# Patient Record
Sex: FEMALE | Race: WHITE | Employment: UNEMPLOYED | URBAN - METROPOLITAN AREA
[De-identification: names, ages, dates, MRNs, and addresses within clinical notes are randomized per-mention and may not be internally consistent; named-entity substitution may affect disease eponyms.]

---
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
## 2021-12-01 ENCOUNTER — TELEPHONE (OUTPATIENT)
Dept: FAMILY MEDICINE CLINIC | Age: 21
End: 2021-12-01

## 2021-12-01 NOTE — TELEPHONE ENCOUNTER
Pt would like have a covid antibodie test done; please call pt back with this information       Thank You

## 2021-12-02 NOTE — TELEPHONE ENCOUNTER
1) Patient has not been seen since 2018 therefore is not established with me anymore. 2) Antibody test is not useful and insurance does not usually cover. We do not offer an quantitative test here, only qualitative which will only show if the patient has ever had covid but provides no quantitative number/amount of antibody. She could pay out of pocket at one of the labs that offer this if she searches online.